# Patient Record
Sex: MALE | Race: WHITE | HISPANIC OR LATINO | URBAN - METROPOLITAN AREA
[De-identification: names, ages, dates, MRNs, and addresses within clinical notes are randomized per-mention and may not be internally consistent; named-entity substitution may affect disease eponyms.]

---

## 2023-06-14 ENCOUNTER — EMERGENCY (EMERGENCY)
Facility: HOSPITAL | Age: 38
LOS: 1 days | Discharge: HOME | End: 2023-06-14
Attending: STUDENT IN AN ORGANIZED HEALTH CARE EDUCATION/TRAINING PROGRAM
Payer: SELF-PAY

## 2023-06-14 VITALS
TEMPERATURE: 98 F | HEART RATE: 65 BPM | DIASTOLIC BLOOD PRESSURE: 68 MMHG | RESPIRATION RATE: 18 BRPM | SYSTOLIC BLOOD PRESSURE: 132 MMHG | OXYGEN SATURATION: 98 %

## 2023-06-14 VITALS
WEIGHT: 139.99 LBS | HEART RATE: 89 BPM | OXYGEN SATURATION: 98 % | SYSTOLIC BLOOD PRESSURE: 163 MMHG | DIASTOLIC BLOOD PRESSURE: 101 MMHG | HEIGHT: 65 IN | RESPIRATION RATE: 22 BRPM

## 2023-06-14 LAB
ALBUMIN SERPL ELPH-MCNC: 4.3 G/DL — SIGNIFICANT CHANGE UP (ref 3.3–5.2)
ALP SERPL-CCNC: 96 U/L — SIGNIFICANT CHANGE UP (ref 40–120)
ALT FLD-CCNC: 37 U/L — SIGNIFICANT CHANGE UP
ANION GAP SERPL CALC-SCNC: 13 MMOL/L — SIGNIFICANT CHANGE UP (ref 5–17)
ANION GAP SERPL CALC-SCNC: 18 MMOL/L — HIGH (ref 5–17)
APAP SERPL-MCNC: <3 UG/ML — LOW (ref 10–26)
AST SERPL-CCNC: 62 U/L — HIGH
BASOPHILS # BLD AUTO: 0.05 K/UL — SIGNIFICANT CHANGE UP (ref 0–0.2)
BASOPHILS NFR BLD AUTO: 0.5 % — SIGNIFICANT CHANGE UP (ref 0–2)
BILIRUB SERPL-MCNC: 1.3 MG/DL — SIGNIFICANT CHANGE UP (ref 0.4–2)
BUN SERPL-MCNC: 3.2 MG/DL — LOW (ref 8–20)
BUN SERPL-MCNC: 3.9 MG/DL — LOW (ref 8–20)
CALCIUM SERPL-MCNC: 7.9 MG/DL — LOW (ref 8.4–10.5)
CALCIUM SERPL-MCNC: 8.7 MG/DL — SIGNIFICANT CHANGE UP (ref 8.4–10.5)
CHLORIDE SERPL-SCNC: 101 MMOL/L — SIGNIFICANT CHANGE UP (ref 96–108)
CHLORIDE SERPL-SCNC: 95 MMOL/L — LOW (ref 96–108)
CO2 SERPL-SCNC: 22 MMOL/L — SIGNIFICANT CHANGE UP (ref 22–29)
CO2 SERPL-SCNC: 24 MMOL/L — SIGNIFICANT CHANGE UP (ref 22–29)
CREAT SERPL-MCNC: 0.59 MG/DL — SIGNIFICANT CHANGE UP (ref 0.5–1.3)
CREAT SERPL-MCNC: 0.66 MG/DL — SIGNIFICANT CHANGE UP (ref 0.5–1.3)
EGFR: 124 ML/MIN/1.73M2 — SIGNIFICANT CHANGE UP
EGFR: 128 ML/MIN/1.73M2 — SIGNIFICANT CHANGE UP
EOSINOPHIL # BLD AUTO: 0 K/UL — SIGNIFICANT CHANGE UP (ref 0–0.5)
EOSINOPHIL NFR BLD AUTO: 0 % — SIGNIFICANT CHANGE UP (ref 0–6)
ETHANOL SERPL-MCNC: 46 MG/DL — HIGH (ref 0–9)
GLUCOSE SERPL-MCNC: 117 MG/DL — HIGH (ref 70–99)
GLUCOSE SERPL-MCNC: 119 MG/DL — HIGH (ref 70–99)
HCT VFR BLD CALC: 44.7 % — SIGNIFICANT CHANGE UP (ref 39–50)
HGB BLD-MCNC: 15.9 G/DL — SIGNIFICANT CHANGE UP (ref 13–17)
IMM GRANULOCYTES NFR BLD AUTO: 0.3 % — SIGNIFICANT CHANGE UP (ref 0–0.9)
LIDOCAIN IGE QN: 18 U/L — LOW (ref 22–51)
LYMPHOCYTES # BLD AUTO: 1.33 K/UL — SIGNIFICANT CHANGE UP (ref 1–3.3)
LYMPHOCYTES # BLD AUTO: 12.6 % — LOW (ref 13–44)
MAGNESIUM SERPL-MCNC: 1.5 MG/DL — LOW (ref 1.6–2.6)
MCHC RBC-ENTMCNC: 29.9 PG — SIGNIFICANT CHANGE UP (ref 27–34)
MCHC RBC-ENTMCNC: 35.6 GM/DL — SIGNIFICANT CHANGE UP (ref 32–36)
MCV RBC AUTO: 84 FL — SIGNIFICANT CHANGE UP (ref 80–100)
MONOCYTES # BLD AUTO: 0.43 K/UL — SIGNIFICANT CHANGE UP (ref 0–0.9)
MONOCYTES NFR BLD AUTO: 4.1 % — SIGNIFICANT CHANGE UP (ref 2–14)
NEUTROPHILS # BLD AUTO: 8.7 K/UL — HIGH (ref 1.8–7.4)
NEUTROPHILS NFR BLD AUTO: 82.5 % — HIGH (ref 43–77)
PLATELET # BLD AUTO: 327 K/UL — SIGNIFICANT CHANGE UP (ref 150–400)
POTASSIUM SERPL-MCNC: 3.7 MMOL/L — SIGNIFICANT CHANGE UP (ref 3.5–5.3)
POTASSIUM SERPL-MCNC: 3.9 MMOL/L — SIGNIFICANT CHANGE UP (ref 3.5–5.3)
POTASSIUM SERPL-SCNC: 3.7 MMOL/L — SIGNIFICANT CHANGE UP (ref 3.5–5.3)
POTASSIUM SERPL-SCNC: 3.9 MMOL/L — SIGNIFICANT CHANGE UP (ref 3.5–5.3)
PROT SERPL-MCNC: 7.4 G/DL — SIGNIFICANT CHANGE UP (ref 6.6–8.7)
RBC # BLD: 5.32 M/UL — SIGNIFICANT CHANGE UP (ref 4.2–5.8)
RBC # FLD: 12.7 % — SIGNIFICANT CHANGE UP (ref 10.3–14.5)
SALICYLATES SERPL-MCNC: <0.6 MG/DL — LOW (ref 10–20)
SODIUM SERPL-SCNC: 135 MMOL/L — SIGNIFICANT CHANGE UP (ref 135–145)
SODIUM SERPL-SCNC: 137 MMOL/L — SIGNIFICANT CHANGE UP (ref 135–145)
WBC # BLD: 10.54 K/UL — HIGH (ref 3.8–10.5)
WBC # FLD AUTO: 10.54 K/UL — HIGH (ref 3.8–10.5)

## 2023-06-14 PROCEDURE — 93010 ELECTROCARDIOGRAM REPORT: CPT

## 2023-06-14 PROCEDURE — 85025 COMPLETE CBC W/AUTO DIFF WBC: CPT

## 2023-06-14 PROCEDURE — 36415 COLL VENOUS BLD VENIPUNCTURE: CPT

## 2023-06-14 PROCEDURE — 99284 EMERGENCY DEPT VISIT MOD MDM: CPT | Mod: 25

## 2023-06-14 PROCEDURE — 99285 EMERGENCY DEPT VISIT HI MDM: CPT

## 2023-06-14 PROCEDURE — 80048 BASIC METABOLIC PNL TOTAL CA: CPT

## 2023-06-14 PROCEDURE — T1013: CPT

## 2023-06-14 PROCEDURE — 93005 ELECTROCARDIOGRAM TRACING: CPT

## 2023-06-14 PROCEDURE — 83690 ASSAY OF LIPASE: CPT

## 2023-06-14 PROCEDURE — 96374 THER/PROPH/DIAG INJ IV PUSH: CPT

## 2023-06-14 PROCEDURE — 96375 TX/PRO/DX INJ NEW DRUG ADDON: CPT

## 2023-06-14 PROCEDURE — 80307 DRUG TEST PRSMV CHEM ANLYZR: CPT

## 2023-06-14 PROCEDURE — 83735 ASSAY OF MAGNESIUM: CPT

## 2023-06-14 PROCEDURE — 80053 COMPREHEN METABOLIC PANEL: CPT

## 2023-06-14 RX ORDER — SODIUM CHLORIDE 9 MG/ML
1000 INJECTION INTRAMUSCULAR; INTRAVENOUS; SUBCUTANEOUS ONCE
Refills: 0 | Status: COMPLETED | OUTPATIENT
Start: 2023-06-14 | End: 2023-06-14

## 2023-06-14 RX ORDER — THIAMINE MONONITRATE (VIT B1) 100 MG
100 TABLET ORAL ONCE
Refills: 0 | Status: COMPLETED | OUTPATIENT
Start: 2023-06-14 | End: 2023-06-14

## 2023-06-14 RX ORDER — MAGNESIUM SULFATE 500 MG/ML
2 VIAL (ML) INJECTION ONCE
Refills: 0 | Status: COMPLETED | OUTPATIENT
Start: 2023-06-14 | End: 2023-06-14

## 2023-06-14 RX ORDER — POTASSIUM CHLORIDE 20 MEQ
40 PACKET (EA) ORAL ONCE
Refills: 0 | Status: COMPLETED | OUTPATIENT
Start: 2023-06-14 | End: 2023-06-14

## 2023-06-14 RX ORDER — DIAZEPAM 5 MG
5 TABLET ORAL ONCE
Refills: 0 | Status: DISCONTINUED | OUTPATIENT
Start: 2023-06-14 | End: 2023-06-14

## 2023-06-14 RX ORDER — FOLIC ACID 0.8 MG
1 TABLET ORAL DAILY
Refills: 0 | Status: DISCONTINUED | OUTPATIENT
Start: 2023-06-14 | End: 2023-06-22

## 2023-06-14 RX ADMIN — Medication 1 TABLET(S): at 16:50

## 2023-06-14 RX ADMIN — Medication 5 MILLIGRAM(S): at 15:24

## 2023-06-14 RX ADMIN — Medication 25 GRAM(S): at 16:51

## 2023-06-14 RX ADMIN — Medication 1 MILLIGRAM(S): at 16:54

## 2023-06-14 RX ADMIN — Medication 100 MILLIGRAM(S): at 16:50

## 2023-06-14 RX ADMIN — SODIUM CHLORIDE 1000 MILLILITER(S): 9 INJECTION INTRAMUSCULAR; INTRAVENOUS; SUBCUTANEOUS at 16:51

## 2023-06-14 RX ADMIN — SODIUM CHLORIDE 1000 MILLILITER(S): 9 INJECTION INTRAMUSCULAR; INTRAVENOUS; SUBCUTANEOUS at 15:25

## 2023-06-14 RX ADMIN — Medication 40 MILLIEQUIVALENT(S): at 16:50

## 2023-06-14 RX ADMIN — Medication 100 MILLIGRAM(S): at 18:31

## 2023-06-14 NOTE — ED PROVIDER NOTE - NSFOLLOWUPINSTRUCTIONS_ED_ALL_ED_FT
Síndrome de abstinencia alcohólica  Alcohol Withdrawal Syndrome    El síndrome de abstinencia alcohólica es un conjunto de síntomas que pueden aparecer cuando jessie persona que miguel mucho y con regularidad oracio de beber o miguel menos que antes. El síndrome de abstinencia alcohólica puede ser leve o grave y puede incluso ser potencialmente mortal.    En general, el síndrome de abstinencia alcohólica afecta a las personas que tienen un trastorno debido al consumo de alcohol, lo que también se denomina alcoholismo. El trastorno debido al consumo de alcohol ocurre cuando jessie persona no puede controlar cuánto miguel y el hecho de beber demasiado o con mucha frecuencia ocasiona problemas en el hogar, en el trabajo o en las relaciones.    ¿Cuáles son las causas?  El consumo excesivo y frecuente de alcohol provoca cambios en la química del cerebro. Con el paso del tiempo, el cuerpo se vuelve dependiente al alcohol. Cuando se oracio de consumir alcohol, el sistema químico del cerebro se desequilibra y causa los síntomas de abstinencia alcohólica.    ¿Qué incrementa el riesgo?  El síndrome de abstinencia alcohólica es más probable que ocurra en las personas que beben más del límite recomendado de alcohol (2 medidas por día para los hombres o 1 medida por día para las mujeres que no están embarazadas). También es más probable que afecte a las personas que beben demasiado y que consumen alcohol desde hace mucho tiempo. Mientras más laureen jessie persona, y mientras más tiempo lo vibha, mayor será el riesgo de síndrome de abstinencia alcohólica.    La abstinencia grave es más probable en jessie persona que tiene estas características:    Tuvo abstinencia alcohólica grave en el pasado.  Tuvo jessie convulsión roberto un episodio anterior de abstinencia alcohólica.  Es de edad avanzada.  Consume otras drogas.  Tiene un problema médico a clifford plazo (crónico), gabi jessie enfermedad en el corazón, los pulmones o el hígado.  Tiene depresión.  No recibe suficientes nutrientes de cabrera alimentación (desnutrición).    ¿Cuáles son los signos o los síntomas?  Los síntomas de esta afección pueden ser de leves a moderados, o pueden ser graves. Los síntomas pueden aparecer algunas horas (o hasta un día) después de que la persona cambia corona hábitos de consumo de alcohol. Roberto las 48 horas después de que la persona oracio de beber, los siguientes síntomas pueden desaparecer o mejorar:    Temblores incontrolables.  Sudoración.  Dolor de lyric.  Ansiedad.  Incapacidad de relajarse (agitación).  Problemas para dormir (insomnio).  Latidos cardíacos irregulares (palpitaciones).  Sentir jessie necesidad imperiosa de beber alcohol.  Convulsiones.    Los siguientes síntomas pueden empeorar entre 24 y 48 horas después de que la persona ha disminuido o abandonado el consumo de alcohol y pueden mejorar gradualmente en el término de días o semanas:    Náuseas y vómitos.  Fatiga.  Sensibilidad a la madison y los sonidos.  Confusión e incapacidad de pensar con claridad.  Pérdida del apetito.  Cambios de humor, irritabilidad, depresión y ansiedad.  Insomnio y pesadillas.    Los siguientes síntomas son graves y potencialmente mortales. Cuando estos síntomas se presentan simultáneamente, se denominan delirium tremens (DT):    Presión arterial shantell.  Aumento de la frecuencia cardíaca.  Dificultad para respirar.  Convulsiones. Estos síntomas pueden desaparecer junto con otros síntomas, o pueden persistir.  [place], oír, sentir, oler o percibir el sabor de cosas que no existen (alucinaciones). Las alucinaciones generalmente comienzan entre 12 y 24 horas después de un cambio en los hábitos de consumo.    Cuando jessie persona tiene delirium tremens, es necesario hospitalizarla de inmediato.    ¿Cómo se diagnostica?  Esta afección se puede diagnosticar en función de lo siguiente:    Los síntomas y antecedentes médicos.  Los antecedentes de consumo de alcohol. El médico puede hacerle preguntas sobre cabrrea comportamiento con respecto al alcohol. Es importante que responda esas preguntas con sinceridad.  Jessie evaluación psicológica.  Un examen físico.  Análisis de chapraro o análisis de orina para medir el nivel de alcohol en la chaparro y descartar otras causas de los síntomas.  Resonancia magnética (RM) o exploración por tomografía computarizada (TC). Ashlee estudio se puede realizar si la persona tiene pensamientos o comportamientos anormales (estado mental alterado).    El diagnóstico es difícil de realizar. Las personas que tienen abstinencia suelen evitar buscar atención médica y no piensan con claridad. Los amigos y familiares desempeñan un papel importante a la hora de reconocer los síntomas y alentar a corona seres queridos a recibir tratamiento.    ¿Cómo se trata?  La mayoría de las personas con síntomas de abstinencia pueden recibir tratamiento fuera del entorno hospitalario (tratamiento ambulatorio), con un control estrecho, gabi visitas diarias a un médico y apoyo psicológico. Es posible que el tratamiento deba recibirse en un hospital o centro de tratamiento (tratamiento con hospitalización) cuando la persona:    Tiene antecedentes de delirium tremens o convulsiones.  Tiene síntomas graves.  Es adicta a otras drogas.  No puede tragar los medicamentos.  Tiene jessie enfermedad grave, gabi insuficiencia cardíaca.  Tuvo abstinencia anteriormente, gloria luego continuó con el consumo de alcohol.  Es poco probable que respete el programa de un tratamiento ambulatorio.    El tratamiento puede incluir lo siguiente:    Control de la presión arterial, el pulso y la respiración.  Líquidos intravenosos (i.v.) para mantener la hidratación.  Medicamentos para aliviar los síntomas de la abstinencia y el malestar (benzodiazepinas).  Medicamentos para reducir la ansiedad.  Medicamentos para prevenir o controlar las convulsiones.  Multivitaminas y vitaminas B.  El control diario de un médico.    Es importante recibir tratamiento temprano para la abstinencia alcohólica. Un tratamiento temprano puede contribuir a lo siguiente:    Acelerar la recuperación de los síntomas de abstinencia.  Tener más éxito en abandonar el consumo de alcohol a clifford plazo (sobriedad).    Si necesita ayuda para dejar de consumir alcohol, el médico puede recomendar un plan de tratamiento a clifford plazo que incluya:    Medicamentos para ayudar a tratar el trastorno por consumo de bebidas alcohólicas.  Apoyo psicológico para el abuso de sustancias.  Grupos de apoyo.    Siga estas indicaciones en cabrera casa:     [place] los medicamentos de venta esau y los recetados (incluidos los suplementos de vitaminas) solamente gabi se lo haya indicado el médico.  No laureen alcohol.  No conduzca hasta que el médico lo autorice.  Pídale a jessie persona de cabrera confianza que se quede con usted o que esté disponible por si necesita ayuda con corona síntomas o para no beber.  Laureen suficiente líquido gabi para mantener la orina de color amarillo pálido.  Considere unirse a un programa de tratamiento o a un cornel de apoyo para tratar el alcoholismo. Estos pueden brindar apoyo emocional, consejos y orientación.  Concurra a todas las visitas de seguimiento gabi se lo haya indicado el médico. [place] es importante.    Comuníquese con un médico si:  Los síntomas empeoran en vez de aliviarse.  No puede comer ni beber sin vomitar.  Le está costando demasiado no beber alcohol.  No puede dejar de beber alcohol.    Solicite ayuda de inmediato si:  Tiene latidos cardíacos irregulares.  Siente dolor en el pecho.  Tiene dificultad para respirar.  Tiene jessie convulsión por primera vez.  Tiene alucinaciones.  Se siente confundido.    Resumen  La abstinencia alcohólica es un cornel de síntomas que pueden aparecer cuando jessie persona que miguel mucho y con regularidad oracio de beber o miguel menos que antes.  Los síntomas de esta afección pueden ser de leves a moderados, o pueden ser graves.  El tratamiento puede incluir hospitalización, medicamentos y apoyo psicológico.    NOTAS ADICIONALES E INSTRUCCIONES    Por favor tenga seguimiento con cabrera doctor primario entre 2 mary.  Regrese a urgencias por cualquier preocupacion medica.

## 2023-06-14 NOTE — ED ADULT TRIAGE NOTE - CHIEF COMPLAINT QUOTE
Pt arrives to ED requesting detox from [facility] . Also drink yesterday approx 1 pint of vodka .Pt visibly shaking

## 2023-06-14 NOTE — ED PROVIDER NOTE - CLINICAL SUMMARY MEDICAL DECISION MAKING FREE TEXT BOX
37yo male with pmh of alcohol misuse presents for alcohol withdrawal. 37yo male with pmh of alcohol misuse presents for alcohol withdrawal. Pt with mild withdrawal improved with medications, pt offered MICHELLE/other inpt options but states he will be able to stop on his own just needed help for today. Pt's gap closed, stable for dc with follow up and outpt resources

## 2023-06-14 NOTE — ED PROVIDER NOTE - OBJECTIVE STATEMENT
39yo male with pmh of alcohol misuse presents for alcohol withdrawal. Pt states for the past few weeks has been drinking alcohol every day about a pint of liquor per day. Pt states last drink was this morning hoping it would help with the nausea. Pt also took dramamine for the nausea. Pt states he just needs help today to get over his nausea and tremors. Pt with epigastric pain. Pt denies fevers/chills, ha, loc, focal neuro deficits, cp/sob/palp, cough, v/d, urinary symptoms, recent travel and sick contacts.

## 2023-06-14 NOTE — ED ADULT NURSE NOTE - CAS ELECT INFOMATION PROVIDED
DC instructions given and verbalized understanding. Pt remains alert and O x4. NAD noted. Resp E/U./DC instructions

## 2023-06-14 NOTE — ED PROVIDER NOTE - PHYSICAL EXAMINATION
Const: Awake, alert and oriented. In no acute distress. Well appearing.  HEENT: NC/AT. Moist mucous membranes.  Eyes: No scleral icterus. EOMI.  Neck:. Soft and supple. Full ROM without pain.  Cardiac: Regular rate and regular rhythm. +S1/S2. Peripheral pulses 2+ and symmetric. No LE edema.  Resp: Speaking in full sentences. No evidence of respiratory distress. No wheezes, rales or rhonchi.  Abd: Soft, non-tender, non-distended. Normal bowel sounds in all 4 quadrants. No guarding or rebound.  Back: Spine midline and non-tender. No CVAT.  Skin: No rashes, abrasions or lacerations.  Lymph: No cervical lymphadenopathy.  Neuro: Awake, alert & oriented x 3. Moves all extremities symmetrically. +tremors

## 2023-06-14 NOTE — ED ADULT NURSE NOTE - NSFALLUNIVINTERV_ED_ALL_ED
Bed/Stretcher in lowest position, wheels locked, appropriate side rails in place/Call bell, personal items and telephone in reach/Instruct patient to call for assistance before getting out of bed/chair/stretcher/Non-slip footwear applied when patient is off stretcher/[place] to call system/Physically safe environment - no spills, clutter or unnecessary equipment/Purposeful proactive rounding/Room/bathroom lighting operational, light cord in reach

## 2023-06-14 NOTE — ED PROVIDER NOTE - PATIENT PORTAL LINK FT
You can access the FollowMyHealth Patient Portal offered by [facility] by registering at the following website: [email or web address]. By joining [facility]’s FollowMyHealth portal, you will also be able to view your health information using other applications (apps) compatible with our system.
